# Patient Record
Sex: MALE
[De-identification: names, ages, dates, MRNs, and addresses within clinical notes are randomized per-mention and may not be internally consistent; named-entity substitution may affect disease eponyms.]

---

## 2018-12-29 PROBLEM — Z00.00 ENCOUNTER FOR PREVENTIVE HEALTH EXAMINATION: Status: ACTIVE | Noted: 2018-12-29

## 2019-01-28 ENCOUNTER — APPOINTMENT (OUTPATIENT)
Dept: UROLOGY | Facility: CLINIC | Age: 44
End: 2019-01-28
Payer: COMMERCIAL

## 2019-01-28 VITALS
DIASTOLIC BLOOD PRESSURE: 82 MMHG | HEART RATE: 99 BPM | HEIGHT: 65 IN | BODY MASS INDEX: 34.16 KG/M2 | SYSTOLIC BLOOD PRESSURE: 132 MMHG | WEIGHT: 205 LBS

## 2019-01-28 DIAGNOSIS — F52.21 MALE ERECTILE DISORDER: ICD-10-CM

## 2019-01-28 DIAGNOSIS — N52.9 MALE ERECTILE DYSFUNCTION, UNSPECIFIED: ICD-10-CM

## 2019-01-28 DIAGNOSIS — Z87.891 PERSONAL HISTORY OF NICOTINE DEPENDENCE: ICD-10-CM

## 2019-01-28 DIAGNOSIS — Z78.9 OTHER SPECIFIED HEALTH STATUS: ICD-10-CM

## 2019-01-28 PROCEDURE — 99203 OFFICE O/P NEW LOW 30 MIN: CPT

## 2019-01-28 RX ORDER — SILDENAFIL 20 MG/1
20 TABLET ORAL
Qty: 30 | Refills: 6 | Status: ACTIVE | COMMUNITY
Start: 2019-01-28 | End: 1900-01-01

## 2019-01-28 NOTE — ASSESSMENT
[FreeTextEntry1] : JESSE CHAPARRO is a 43 year old male with a past medical history of ED. Presents to the office today for ED  x 7 years, worsening over time. Patient happily  for 20 years. Patient able to get a weak erection but unable to sustain longer than 2 minutes. Able to penetrate every time. Intermittent morning erections, and no spontaneous erections. Denies trauma to the genital area and no curvature of the penis. No marijuana or testosterone usage. Denies depression or anxiety. Has sex drive and good energy level. Upon self stimulation, erections are stronger Patient on SIldenafil 50 mg as needed, and states minimal results, not satisfied with results. In addition, patient has also tried Cialis 5 mg and no results.  PSA normal, and maternal grandfather had prostate cancer. Non smoker. Denies any urinary symptoms. No cardiac history.\par \par IIEF-5 score= 14 - MILD MODERATE ED\par \par 12/17/2018\par PSA.-  0.4 ng/ml, 25 % Free PSA\par UA.-  negative \par HgA1C.-  5.6\par Testosterone 527 ( [id number])

## 2019-01-28 NOTE — HISTORY OF PRESENT ILLNESS
[Currently Experiencing ___] :  [unfilled] [None] : None [FreeTextEntry1] : JESSE CHAPARRO is a 43 year old male with a past medical history of ED. Presents to the office today for ED  x 7 years, worsening over time. Patient happily  for 20 years. Patient able to get a weak erection but unable to sustain longer than 2 minutes. Able to penetrate every time. Intermittent morning erections, and no spontaneous erections. Denies trauma to the genital area and no curvature of the penis. No marijuana or testosterone usage. Denies depression or anxiety. Has sex drive and good energy level. Upon self stimulation, erections are stronger Patient on SIldenafil 50 mg as needed, and states minimal results, not satisfied with results. In addition, patient has also tried Cialis 5 mg and no results.  PSA normal, and maternal grandfather had prostate cancer. Non smoker. Denies any urinary symptoms. No cardiac history.\par \par IIEF-5 score= 14 - MILD MODERATE ED\par \par 12/17/2018\par PSA.-  0.4 ng/ml, 25 % Free PSA\par UA.-  negative \par HgA1C.-  5.6\par Testosterone 527 ( [phone])

## 2019-01-28 NOTE — LETTER BODY
[Dear  ___] : Dear  [unfilled], [Courtesy Letter:] : I had the pleasure of seeing your patient, [unfilled], in my office today. [Please see my note below.] : Please see my note below. [Consult Closing:] : Thank you very much for allowing me to participate in the care of this patient.  If you have any questions, please do not hesitate to contact me. [Sincerely,] : Sincerely, [FreeTextEntry2] : Dr. Sobeida Tate [FreeTextEntry3] : Raheel Leroy MD\par Director of Male Sexual Dysfunction and Urologic Prosthetics

## 2019-03-11 ENCOUNTER — APPOINTMENT (OUTPATIENT)
Dept: UROLOGY | Facility: CLINIC | Age: 44
End: 2019-03-11

## 2021-05-05 ENCOUNTER — APPOINTMENT (OUTPATIENT)
Dept: UROLOGY | Facility: CLINIC | Age: 46
End: 2021-05-05